# Patient Record
Sex: FEMALE | Race: WHITE | URBAN - METROPOLITAN AREA
[De-identification: names, ages, dates, MRNs, and addresses within clinical notes are randomized per-mention and may not be internally consistent; named-entity substitution may affect disease eponyms.]

---

## 2017-03-27 ENCOUNTER — HOSPITAL ENCOUNTER (OUTPATIENT)
Facility: CLINIC | Age: 52
End: 2017-03-27
Attending: OBSTETRICS & GYNECOLOGY | Admitting: OBSTETRICS & GYNECOLOGY

## 2021-01-18 ENCOUNTER — HOSPITAL ENCOUNTER (OUTPATIENT)
Facility: CLINIC | Age: 56
End: 2021-01-18
Attending: OBSTETRICS & GYNECOLOGY | Admitting: OBSTETRICS & GYNECOLOGY

## 2021-04-02 ENCOUNTER — HOSPITAL ENCOUNTER (OUTPATIENT)
Facility: CLINIC | Age: 56
End: 2021-04-02
Attending: OBSTETRICS & GYNECOLOGY | Admitting: OBSTETRICS & GYNECOLOGY
Payer: COMMERCIAL

## 2021-05-30 DIAGNOSIS — Z11.59 ENCOUNTER FOR SCREENING FOR OTHER VIRAL DISEASES: ICD-10-CM

## 2025-05-07 NOTE — H&P (VIEW-ONLY)
[facility]      Preoperative Consultation   Barbara Ta   : 1965   Gender: female  Date of Encounter: 2025  Date of Surgery: 2025  Surgical Specialty: Oncology/[facility]/Surgical Facility:   Fax number: [phone]  Surgery type: outpatient  Primary Physician: Sandra Rey       History of Present Illness     History of Present Illness  Barbara Ta is a 59 y.o. female (1965) who presents for preop evaluation undergoing SURGERY for treatment of robotic assisted total laparoscopic hysterectomy, bilateral,salpingo oophorectomy,interoperative sentinel lymph node mapping bilateral pelvic sentinel lymph node dissections and pelvic washing     The patient presents for a preoperative evaluation for a hysterectomy.  She is scheduled for a hysterectomy due to the presence of an unidentified mass. Despite attempts at biopsies, the mass remains inaccessible. Initial cervical biopsy results were negative for malignancy. An ultrasound was performed, but no MRI has been conducted. The size of the mass is approximately 5 cm. Lymph nodes have not been evaluated yet. A dye test and biopsies of both lymph nodes are planned. H She has been amenorrheic for over 4 years but recently experienced bleeding.  She had one elevated blood sugar test, but it has since normalized without medication. She was on medication for a few months, and her levels went from 9 to 6, and then down to 5.  She has high blood pressure and is currently taking lisinopril/hydrochlorothiazide and losartan/hydrochlorothiazide. She also takes metoprolol. She had an echocardiogram a long time ago, which showed no issues with the mitral valve and aortic valve.       Review of Systems   A comprehensive review of systems was negative except for items noted in HPI.    Patient Active Problem List   Diagnosis Code     Unspecified essential hypertension I10     Mitral valve  disorders I05.9     Aortic valve disorders I35.9     Primary osteoarthritis of left knee M17.12     Prediabetes R73.03     Controlled type 2 diabetes mellitus without complication, without long-term current use of insulin (HC) E11.9     Current Outpatient Medications   Medication Sig     acetaminophen  mg Extended-Release tablet ONE DAILY     azelastine 137 mcg/actuation (ASTELIN) nasal spray Inhale 1 Spray into affected nostril(s) two times daily.     blood sugar diagnostic (Accu-Chek SmartView Test Strip) strip USE TO TEST ONE TIMES DAILY     blood-glucose meter Dispense meter, test strips, lancets covered by pt ins. E11.9 NIDDM type II - Test 2 times/day. Reason: High A1C     cetirizine (ZYRTEC) 10 mg tablet Take 1 tablet by mouth once daily.     clobetasol (TEMOVATE) 0.05 % cream Apply topically to affected area(s) two times daily. twice daily to rashes at the body until resolved then stop, not for prolonged use in one location     clobetasol 0.05% TOPICAL (TEMOVATE) 0.05 % external solution Apply topically to affected area(s) two times daily.     desonide 0.05 % ointment Twice daily as needed for rash flaring on face, can use at the eyelids but not longer than 7-10 days     fluticasone (50 mcg per actuation) nasal solution (FLONASE) Inhale 1 Spray into affected nostril(s) two times daily.     ibuprofen (ADVIL; MOTRIN) 600 mg tablet TAKE 1 TABLET (600 MG) BY MOUTH THREE TIMES DAILY WITH MEALS. MAXIMUM OF 3200 MG IN 24 HOURS.     lancets (Accu-Chek Fastclix Lancet Drum) USE TO TEST ONE TIMES DAILY     losartan-hydrochlorothiazide (HYZAAR) 100-25 mg tablet Take 1 Tablet by mouth once daily.     metoprolol succinate (Toprol XL) 25 mg Sustained-Release tablet Take 1 Tablet (25 mg) by mouth once daily.     metroNIDAZOLE (METROCREAM) 0.75 % cream apply twice daily to cheeks / nose / chin for rosacea     pimecrolimus (Elidel) 1 % cream Apply topically to affected area(s) two times daily. as needed for milder body  rashes or rashes on face     No current facility-administered medications for this visit.     Allergies   Allergen Reactions     Ciprofloxacin Other - Describe In Comment Field     Eye drops caused  Pain and swelling over medial inferior eye over tear duct- ? Allergic reaction.      Erythromycin Nausea Only and Nausea And Vomiting     Pcn [Penicillins] Rash     Statins-Hmg-Coa Reductase Inhibitors Myalgia     Sulfa (Sulfonamide Antibiotics) Edema and Erythema     Sulfasalazine Edema and Rash     Unknown [Unlisted Allergen (Include Detail In Comments)]      no latex allergy     Past Surgical History:   . Laterality Date     PELVIC LAPAROSCOPY  1990     Social History     Tobacco Use     Smoking status: Never     Smokeless tobacco: Never   Vaping Use     Vaping status: Never Used   Substance Use Topics     Alcohol use: Yes     Comment: on occasion     Drug use: No     Family History   Problem Relation Age of Onset     Hypertension Other         maternal rel     Diabetes Father      Cancer Father         skin, basal cells likely     Heart Disease Paternal Grandfather      Results  Labs   - Cervical biopsy: Negative for malignancy    Imaging   - Ultrasound: Mass of approximately 5 cm      PAST DIFFICULTY WITH ANESTHESIA: None     Physical Exam   /80 (Cuff Site: Right Arm, Position: Sitting, Cuff Size: Adult Regular)   LMP 01/01/2021  There is no height or weight on file to calculate BMI.  Physical Exam  Respiratory: Clear to auscultation, no wheezing, rales or rhonchi  Cardiovascular: Regular rate and rhythm, no murmurs, rubs, or gallops  General Appearance: Pleasant, alert, appropriate appearance for age. No acute distress  Head Exam: Normal. Normocephalic, atraumatic.  Eye Exam: Normal external eye, conjunctiva, lids, cornea. JASSI.  Funduscopic Exam: Normal red reflex and fundoscopic exam.  Ear Exam: Normal TM's bilaterally. Normal auditory canals and external ears. Non-tender.  Nose Exam: Normal external nose,  mucus membranes, and septum.  OroPharynx Exam: Dental hygiene adequate. Normal buccal mucosa. Normal pharynx.  Neck Exam: Supple, no masses or nodes.  Thyroid Exam: No nodules or enlargement.  Chest/Respiratory Exam: Normal chest wall and respirations. Clear to auscultation.  Cardiovascular Exam: Regular rate and rhythm. S1, S2, no murmur, click, gallop, or rubs.  Gastrointestinal Exam: Soft, nontender, no abnormal masses or organomegaly.  Lymphatic Exam: Normal.  Musculoskeletal Exam: Hip: full ROM  Skin: no rash or abnormalities  Neurologic Exam: Nonfocal; symmetric DTRs, normal gross motor movement, tone, and coordination. No tremor.  Psychiatric Exam: Alert and oriented, appropriate affect      Assessment / Plan     Labs: yes  ECG: no  Barbara was seen today for preoperative exam.    Diagnoses and all orders for this visit:    Pre-op exam       Assessment & Plan  1. Preoperative evaluation for hysterectomy.  - Scheduled for hysterectomy due to a mass that has not been biopsied.  - Initial cervical biopsy results were negative for malignancy; ultrasound performed, no MRI conducted; mass size approximately 5 cm.  - Lymph nodes have not been evaluated yet; dye test and biopsies of both lymph nodes are planned.  - Blood test to assess hemoglobin and kidney function; renal function and potassium levels to be evaluated due to current medication regimen of losartan and hydrochlorothiazide; specific instructions regarding medication management preoperatively will be provided.    2. Prediabetes.  - Status will be maintained as is.    3. Hypertension.  - Blood pressure is well-regulated today.  - Currently on lisinopril/hydrochlorothiazide and losartan/hydrochlorothiazide, as well as metoprolol.  - No issues reported with mitral valve and aortic valve; previous echocardiogram results were normal.    Patient is cleared for planned procedure.   Electronically Signed by:   Thank you  Kimi Negrete MD 5/7/2025 4:36 PM    5/7/2025    The Pre-Op Tool    Recommendations      Intermediate Risk Procedure    Risk of CV Complication (RCRI)  0.5%    Current Cardiac Status  Good exertional capacity ( > 4 mets )    Cardiac History  No history of coronary artery disease           Labs  HGB within last 30 days  Potassium within last 30 days  EKG  Not indicated  CXR  Not indicated    Stress Testing  Not indicated    * Testing recommendations are intended to assist, but not direct, clinical decisions.           Type & Screen should be obtained by Anesthesia only if the risk of transfusion is > 5% for the procedure         Hold Losartan / HCTZ the evening before and/or morning of the procedure.  Continue taking Metoprolol (Lopressor, Toprol); be sure to take the evening before and/or morning of the procedure.  Take your other medications as usual prior to the procedure  Hold vitamins and/or supplements for 1 week prior to the procedure  Hold fish oil for 2 weeks prior to the procedure  Okay to take Acetaminophen (Tylenol) up until the procedure  Hold / avoid NSAIDs (e.g. ibuprofen, naproxen) prior to procedure: 2 days for ibuprofen (Advil) and 4 days for naproxen (Aleve).    * Medication recommendations are not intended to be exhaustive; they are limited to common medications that are potentially dangerous if incorrectly managed          Labs  * Data supports elimination of  routine  laboratory testing in favor of focused,  indicated  testing based on medical co-morbidities. A 2009 study randomized 1061 patients undergoing ambulatory, non-cataract surgery to routine or to indicated testing. Perioperative adverse events were similar (Anesthesia & Analgesia 2009;108:467-75; Anesthesiol. Clin. 2016 Mar;34(1):43-58).  EKG  * Age alone is not an absolute indication for a preoperative EKG, and in the absence of clinical risk factors, there is no consensus on the utility of routine preoperative EKG. Our experts recommend against obtaining an EKG if age  < 65 for intermediate risk procedures (Anesthesology. 2012;116(3) 1-17; JACC. 2014;64(21);e1-76).  Stress Testing  * The current ACC/AHA guideline states that 'non-invasive stress testing is not useful for patients [with no clinical risk factors] undergoing noncardiac surgery' (JACC. 2014;64(21);e1-76.).     Session ID: [id number]  Endnotes and bibliography available upon request: [email or web address]

## 2025-05-12 NOTE — PROGRESS NOTES
GYNECOLOGIC ONCOLOGY CONSULT    Patient Name: THAIS OJEDA Patient : 1965  Patient MRN: [id number]  Referring Physician: Carrie Leroy MD  Primary GYN Oncologist: Shivani Schafer (Gynecological/Oncology)  Date of Service: 2025    Reason for Consult:  Postmenopausal bleeding   Uterine mass   Concerning family history for Maxwell syndrome     History of Present Illness (Gyn Oncology):  Ms. Jimenez is a francois 59-year-old  postmenopausal female here today due to a hypervascular uterine mass and history of fibroids. Has also been some interval enlargement in this particular mass. She reports some symptoms including several weeks of discharge and sore nipples, followed by bleeding, which has now stopped. She has not had any symptoms for 4 years prior to this episode. Her 1st grandbaby is due in mid-May and the gender is a surprise. Her son gets  in mid-. She also needs a knee replacement. 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 Pet therapy dog, Rohit, was present during today's visit.     Oncology Treatment Summary  2024: Presented for annual well woman exam with Dr. Leroy. Pap smear performed and returned normal. Fibroids were asymptomatic at that time. Denied any postmenopausal bleeding.   2025: Patient noted postmenopausal bleeding. A pelvic US was done that measured the uterus as 6.7 x 4.4 cm with endometrial thickness of 17.1 mm. There is a distorted and enlarged mass previously described since 2019 as a fibroid that was hypervascular and measured 4.3 x 3 x 4.3 cm. No polyps were identified. There were some continued fibroids noted as well, that had previously been seen going back to prior imaging.   04/10/2025: Patient returned to see Dr. Leroy. The endometrial biopsy ECC and polyp were all benign. Hysterectomy was recommended but GYN oncology consultation was recommended for discussion of surgery given use of frozen section  etc. So, patient would not have to go multiple surgeries.   2025: GYN oncology consultation with Dr. Schafer at [facility]. Discussed future surgery.     Genetic Testing  Genetics referral sent on 2025 visit for KARINA or Armando, concerning family history for Maxwell syndrome  Review of Systems:  A complete 14-point review of systems is negative except as noted in the above history of present illness.    Past Medical History:  Postmenopausal bleeding  Uterine fibroids  Abnormal Pap smear  Aortic valve disorder  Type 2 diabetes mellitus  Dysmenorrhea  Endometriosis  Hypertension  Infertility  Menorrhagia  Obesity  Surgical History:  Breast biopsy  Cervical biopsy  Laparoscopic endometriosis removal  [place] tooth removal  OB/Gyn History:     x 3   Menarche age 10-10 yo.  Age of 1st live birth: 25  Postmenopausal  Contraception: Vasectomy   Currently sexually active.  Allergies#  House dust mite (organism), Mold (organism), Penicillins, Pollen (substance), Sulfa (Sulfonamide Antibiotics), ciprofloxacin and erythromycin base    Medications:  Cetirizine Oral 10 mg capsule 1 capsule orally daily  Atenolol Oral 25 mg tablet 1 tablet orally daily  Losartan-Hydrochlorothiazide Oral 50 mg-12.5 mg  Metoprolol Oral 24 hr Tab (Succinate) 25 mg tablet extended release 24 hr 1 tablet extended release 24 hr orally 2 times per day  Family History:  Colon cancer: maternal grandmother, age 30.    Uterine cancer: maternal great aunts   Prostate cancer: maternal grandfather, age 88   Colon and prostate cancer: maternal uncles, one had prostate cancer and one had both colon and prostate cancer.   Skin cancer: mother     Social History:  No tobacco use.   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  Exercises regularly.  Health Maintenance:  Last colonoscopy: has never had a colonoscopy. Does Cologuard screening.  Last DEXA scan: yes, cannot recall dates.   Last  mammogram: 08/16/2024, BIRADS-2   Last Pap smear: 08/16/2024, NILM cytology, negative HPV co-testing    Vital Signs:  Blood pressure: 128/70, Pulse: 76, Temperature: 98.7 F, Respirations: 16, O2 sat: 97%, Pain Scale: 0, Height: 63.25 in, Weight: 196.4 lb, BSA: 1.92, BMI: 34.52 kg/m2    Physical Exam (Gyn Oncology):  General: alert, cooperative, no acute distress  HEENT: normocephalic, trachea midline, no thyromegaly  Lungs: no labored breathing on room air.  Cardiac: regular rate and rhythm  Abdomen: non-distended  Extremities: no edema  Neurologic Exam: no focal deficits  Psych: normal mood and affect    Laboratory Data:  Surgical pathology on 04/07/2025:  Final diagnosis:  Part A: BENIGN ENDOCERVICAL POLYP.  NO EVIDENCE OF DYSPLASIA OR MALIGNANCY.  Part B: BENIGN ENDOCERVICAL TISSUE.  NO EVIDENCE OF DYSPLASIA OR MALIGNANCY.  Part C: INACTIVE ENDOMETRIUM.  NO EVIDENCE OF HYPERPLASIA OR MALIGNANCY.            Imaging:  Pelvic Ultrasound on 04/07/2025:  Impression:  The uterus is anteverted. The endometrium is ill-defined and visualization of the uterien body is obscured by overlying gas. The endometrial thickness is' 17mm. The uterus appears myomatous but only two discrete fibroids are identified today, both stable and slightly smaller than prior.  In the cervix, there is a hyperechoic, heterogenous, hypervascular mass in the anterior wall. This structure was visualized in 2019 and described as a fibroid. It appears to have increased in size.  1. 08/16/2019: 32.6mm x 24.3mm x 32.5mm  2. 01/18/2021: 33.6mm x 27.0mm x 36mm  3. 04/07/2025: 43.4mm x 30.3mm x 43.4mm (today)  The ovaries are obscured by gas but appear quiet. No discrete adnexal cysts or massses.  Moderate amount of gas present in the pelvis.  No discrete submucosal polyps or fibroids identified during the SIS.  SRG performed three biopsies (not ultrasound guided): ECC, EMB and cervical polyp      Problems:  Endometrium thickened (finding)  Family history  of colon cancer  Postmenopausal bleeding    Assessment & Plan (Gyn Oncology):  59-year-old  postmenopausal female with hypervascular uterine mass, possible fibroid with slight enlargement in size and new onset postmenopausal bleeding, EMB, ECC and polyp were all benign. However, given the additional findings with the interval enlargement of mass and hypervascularity was recommended to undergo GYN oncology consultation and consideration of surgery. Also has concerning family history for Maxwell syndrome.    1. Hypervascular uterine mass + postmenopausal bleeding + concerning family history for Maxwell syndrome   We reviewed the excellent work-up patient has had through [facility].   Did discuss that given her postmenopausal status, that fibroids should be expected to shrink over time. Given that there has been some interval enlargement and some hypervascularity associated with this mass around the cervix, definitive surgery would be the recommendation.   Discussed utility of intraoperative frozen section analysis. Discussed possible cancer staging in the event of identification of malignancy.   Reviewed intraoperative sentinel lymph node mapping and dissections as once uterus is out cannot go back and perform this technique.  This can reduce overall risk of lymphedema if endometrial malignancy is identified.   We will have pathology intraoperatively evaluate the mass via intraoperative frozen section analysis.   Discussed ideally would not disrupt the mass and avoid any leakage of possible malignant cells within the abdominal cavity.   We will want to remove the surgical specimen whole.   Discussed ideally this would occur transvaginally. In the event that this is not possible, would convert to a mini-laparotomy.   Patient will be able to discharge home the same day of surgery even in the event of the mini-laparotomy.  We discussed benefits, risks, and alternatives to surgery.   Benefits include:  "definitive tissue diagnosis, decreased worry/anxiety and not having to do all the interval imaging follow-up visits, ability to inform prognosis, and ability to determine if adjuvant therapies are necessary.   Risks include, but are not limited to: bleeding, infection, injury to surrounding structures, postoperative pain/discomfort, possible future hernia formation, temporary postoperative restrictions, inherent risks of general anesthesia, increased risk of medical complications in postoperative period such as blood clots, heart attack, stroke, and even death.   Alternatives do not exist, that could render a definitive tissue diagnosis. Also would not want to biopsy a mass within the abdominal cavity as could risk seeding a malignancy. Thus, surgery would be recommended gold standard of care based on the conglomeration of findings.   Patient desires to proceed with gold standard of care surgery. She had opportunity to meet with Theresa OGLESBY Gyn Onc RN.  She had the opportunity to meet with Shari SORIA Gyn Onc surgical scheduler.   She had a good understanding of the plan of care.   All questions answered to her satisfaction at today's visit.    2. Medical comorbidities:  Personal history of postmenopausal bleeding, uterine fibroids, abnormal Pap smear, aortic valve disorder, type-2 diabetes mellitus, dysmenorrhea, endometriosis, hypertension, infertility, menorrhagia, obesity.    3. Preoperative considerations  Diagnosis Code: R93.89; N95  Surgery: \"robotic-assisted total laparoscopic hysterectomy, bilateral salpingo-oophorectomy, intraoperative sentinel lymph node mapping, bilateral pelvic sentinel lymph node dissections, pelvic washings\"  Estimated Total Time: 150 minutes  Anesthesia: GENERAL  Alert Pathology for Frozen Section: YES  Bowel Prep: No  Joint Case: No  Patient Status: Same-day discharge  Preoperative Clearance Visit: YES  Labs on Day of Surgery: T&S, Hgb  Surgical Antibiotic Prophylaxis: IV Cefazolin " 2 grams + IV Metronidazole 500 milligrams  VTE Prophylaxis: SQ Heparin 5,000 units + bilateral SCDs  Additional Considerations:  preoperative biopsies were all negative - but persistent lower uterine segment mass - will need frozen section of uterus during the case tentatively schedule for 5/19 as patient requires knee replacement surgery in June    Treatment Plan and Consent  Current treatment plan of surgery for definitive tissue diagnosis was reviewed in detail. See counseling above. Specifically, the counseling included: goals of the treatment, prognosis, frequency, intended benefits, associated risks/harms and side effects and medically reasonable alternatives.    I spent a total of 60 minutes in care of patient at today's visit with 40 minutes spent in direct patient interview, brief examination and surgical counseling. The remainder of time with 20 minutes was spent on review of records, review of pathology report, direct conversation with Dr. Leroy just prior to seeing the patient and care coordination.     I provided the patient an opportunity to ask questions and I answered all of their treatment-related questions to the best of my ability. The patient expressed understanding and consent to this plan of care.    Pain Care Management:  Pain Scale: 0    The patient and family/friends if present were apprised of the use of [employer] remote documentation service and all parties consented to conducting the visit in this manner.    Documentation assistance provided by holly James for Shivani Schafer MD on 05/06/2025.    I, Shivani Schafer MD,  personally performed the services described in this documentation, and it is both accurate and complete.      Shivani Schafer MD  Phone: [phone]  Fax: [phone]

## 2025-05-16 RX ORDER — CETIRIZINE HYDROCHLORIDE 10 MG/1
10 TABLET ORAL DAILY
COMMUNITY

## 2025-05-16 RX ORDER — IBUPROFEN 600 MG/1
600 TABLET, FILM COATED ORAL EVERY 8 HOURS PRN
COMMUNITY

## 2025-05-16 RX ORDER — METOPROLOL SUCCINATE 25 MG/1
25 TABLET, EXTENDED RELEASE ORAL DAILY
COMMUNITY

## 2025-05-16 RX ORDER — LOSARTAN POTASSIUM AND HYDROCHLOROTHIAZIDE 25; 100 MG/1; MG/1
1 TABLET ORAL DAILY
Status: ON HOLD | COMMUNITY
End: 2025-05-19

## 2025-05-16 RX ORDER — CLOBETASOL PROPIONATE 0.5 MG/G
CREAM TOPICAL 2 TIMES DAILY
COMMUNITY

## 2025-05-16 RX ORDER — CLOBETASOL PROPIONATE 0.5 MG/ML
SOLUTION TOPICAL 2 TIMES DAILY
COMMUNITY

## 2025-05-16 RX ORDER — FLUTICASONE PROPIONATE 50 MCG
1 SPRAY, SUSPENSION (ML) NASAL 2 TIMES DAILY
COMMUNITY

## 2025-05-16 RX ORDER — AZELASTINE HYDROCHLORIDE 137 UG/1
SPRAY, METERED NASAL 2 TIMES DAILY
COMMUNITY

## 2025-05-16 RX ORDER — DESONIDE 0.5 MG/G
OINTMENT TOPICAL
COMMUNITY

## 2025-05-16 RX ORDER — SENNOSIDES 8.6 MG
650 CAPSULE ORAL EVERY 8 HOURS PRN
COMMUNITY

## 2025-05-19 ENCOUNTER — ANESTHESIA (OUTPATIENT)
Dept: SURGERY | Facility: CLINIC | Age: 60
End: 2025-05-19
Payer: COMMERCIAL

## 2025-05-19 ENCOUNTER — ANESTHESIA EVENT (OUTPATIENT)
Dept: SURGERY | Facility: CLINIC | Age: 60
End: 2025-05-19
Payer: COMMERCIAL

## 2025-05-19 ENCOUNTER — HOSPITAL ENCOUNTER (OUTPATIENT)
Facility: CLINIC | Age: 60
Discharge: HOME | End: 2025-05-19
Attending: OBSTETRICS & GYNECOLOGY | Admitting: OBSTETRICS & GYNECOLOGY
Payer: COMMERCIAL

## 2025-05-19 VITALS
DIASTOLIC BLOOD PRESSURE: 75 MMHG | OXYGEN SATURATION: 92 % | HEART RATE: 67 BPM | WEIGHT: 193 LBS | HEIGHT: 63 IN | SYSTOLIC BLOOD PRESSURE: 150 MMHG | BODY MASS INDEX: 34.2 KG/M2 | RESPIRATION RATE: 18 BRPM | TEMPERATURE: 97 F

## 2025-05-19 DIAGNOSIS — N95.0 POSTMENOPAUSAL BLEEDING: Primary | ICD-10-CM

## 2025-05-19 PROBLEM — R19.00 PELVIC MASS: Status: ACTIVE | Noted: 2025-05-19

## 2025-05-19 LAB
ABO + RH BLD: NORMAL
BLD GP AB SCN SERPL QL: NEGATIVE
CANCER AG125 SERPL-ACNC: 18 U/ML
HGB BLD-MCNC: 15.8 G/DL (ref 11.7–15.7)
MCV RBC AUTO: 90 FL (ref 78–100)
PATH REPORT.COMMENTS IMP SPEC: NORMAL
PATH REPORT.INTRAOP OBS SPEC DOC: NORMAL
SPECIMEN EXP DATE BLD: NORMAL

## 2025-05-19 PROCEDURE — 250N000011 HC RX IP 250 OP 636: Performed by: PHYSICIAN ASSISTANT

## 2025-05-19 PROCEDURE — 250N000009 HC RX 250: Performed by: OBSTETRICS & GYNECOLOGY

## 2025-05-19 PROCEDURE — 258N000003 HC RX IP 258 OP 636: Performed by: ANESTHESIOLOGY

## 2025-05-19 PROCEDURE — 710N000010 HC RECOVERY PHASE 1, LEVEL 2, PER MIN: Performed by: OBSTETRICS & GYNECOLOGY

## 2025-05-19 PROCEDURE — 85018 HEMOGLOBIN: CPT | Performed by: PHYSICIAN ASSISTANT

## 2025-05-19 PROCEDURE — 88331 PATH CONSLTJ SURG 1 BLK 1SPC: CPT | Mod: 26 | Performed by: PATHOLOGY

## 2025-05-19 PROCEDURE — 250N000009 HC RX 250: Performed by: NURSE ANESTHETIST, CERTIFIED REGISTERED

## 2025-05-19 PROCEDURE — 86901 BLOOD TYPING SEROLOGIC RH(D): CPT | Performed by: PHYSICIAN ASSISTANT

## 2025-05-19 PROCEDURE — 88331 PATH CONSLTJ SURG 1 BLK 1SPC: CPT | Mod: TC | Performed by: OBSTETRICS & GYNECOLOGY

## 2025-05-19 PROCEDURE — 258N000003 HC RX IP 258 OP 636: Performed by: NURSE ANESTHETIST, CERTIFIED REGISTERED

## 2025-05-19 PROCEDURE — 258N000001 HC RX 258: Performed by: OBSTETRICS & GYNECOLOGY

## 2025-05-19 PROCEDURE — 710N000012 HC RECOVERY PHASE 2, PER MINUTE: Performed by: OBSTETRICS & GYNECOLOGY

## 2025-05-19 PROCEDURE — 36415 COLL VENOUS BLD VENIPUNCTURE: CPT | Performed by: OBSTETRICS & GYNECOLOGY

## 2025-05-19 PROCEDURE — 250N000025 HC SEVOFLURANE, PER MIN: Performed by: OBSTETRICS & GYNECOLOGY

## 2025-05-19 PROCEDURE — 86304 IMMUNOASSAY TUMOR CA 125: CPT | Performed by: OBSTETRICS & GYNECOLOGY

## 2025-05-19 PROCEDURE — 250N000011 HC RX IP 250 OP 636: Mod: JZ | Performed by: PHYSICIAN ASSISTANT

## 2025-05-19 PROCEDURE — 272N000001 HC OR GENERAL SUPPLY STERILE: Performed by: OBSTETRICS & GYNECOLOGY

## 2025-05-19 PROCEDURE — 88305 TISSUE EXAM BY PATHOLOGIST: CPT | Mod: TC | Performed by: OBSTETRICS & GYNECOLOGY

## 2025-05-19 PROCEDURE — 360N000080 HC SURGERY LEVEL 7, PER MIN: Performed by: OBSTETRICS & GYNECOLOGY

## 2025-05-19 PROCEDURE — 250N000013 HC RX MED GY IP 250 OP 250 PS 637: Performed by: PHYSICIAN ASSISTANT

## 2025-05-19 PROCEDURE — 88307 TISSUE EXAM BY PATHOLOGIST: CPT | Mod: 26 | Performed by: PATHOLOGY

## 2025-05-19 PROCEDURE — 88305 TISSUE EXAM BY PATHOLOGIST: CPT | Mod: 26 | Performed by: PATHOLOGY

## 2025-05-19 PROCEDURE — 250N000011 HC RX IP 250 OP 636: Performed by: NURSE ANESTHETIST, CERTIFIED REGISTERED

## 2025-05-19 PROCEDURE — 999N000141 HC STATISTIC PRE-PROCEDURE NURSING ASSESSMENT: Performed by: OBSTETRICS & GYNECOLOGY

## 2025-05-19 PROCEDURE — 370N000017 HC ANESTHESIA TECHNICAL FEE, PER MIN: Performed by: OBSTETRICS & GYNECOLOGY

## 2025-05-19 PROCEDURE — 250N000011 HC RX IP 250 OP 636: Mod: JZ | Performed by: OBSTETRICS & GYNECOLOGY

## 2025-05-19 PROCEDURE — 250N000009 HC RX 250: Performed by: ANESTHESIOLOGY

## 2025-05-19 PROCEDURE — 250N000011 HC RX IP 250 OP 636: Mod: JZ | Performed by: ANESTHESIOLOGY

## 2025-05-19 RX ORDER — CEFAZOLIN SODIUM/WATER 2 G/20 ML
2 SYRINGE (ML) INTRAVENOUS SEE ADMIN INSTRUCTIONS
Status: DISCONTINUED | OUTPATIENT
Start: 2025-05-19 | End: 2025-05-19 | Stop reason: HOSPADM

## 2025-05-19 RX ORDER — LIDOCAINE HYDROCHLORIDE 20 MG/ML
INJECTION, SOLUTION INFILTRATION; PERINEURAL PRN
Status: DISCONTINUED | OUTPATIENT
Start: 2025-05-19 | End: 2025-05-19

## 2025-05-19 RX ORDER — BUPIVACAINE HYDROCHLORIDE 5 MG/ML
INJECTION, SOLUTION EPIDURAL; INTRACAUDAL; PERINEURAL PRN
Status: DISCONTINUED | OUTPATIENT
Start: 2025-05-19 | End: 2025-05-19 | Stop reason: HOSPADM

## 2025-05-19 RX ORDER — SCOPOLAMINE 1 MG/3D
1 PATCH, EXTENDED RELEASE TRANSDERMAL ONCE
Status: DISCONTINUED | OUTPATIENT
Start: 2025-05-19 | End: 2025-05-19 | Stop reason: HOSPADM

## 2025-05-19 RX ORDER — HEPARIN SODIUM 5000 [USP'U]/.5ML
5000 INJECTION, SOLUTION INTRAVENOUS; SUBCUTANEOUS
Status: COMPLETED | OUTPATIENT
Start: 2025-05-19 | End: 2025-05-19

## 2025-05-19 RX ORDER — ONDANSETRON 4 MG/1
4 TABLET, ORALLY DISINTEGRATING ORAL EVERY 30 MIN PRN
Status: DISCONTINUED | OUTPATIENT
Start: 2025-05-19 | End: 2025-05-19 | Stop reason: HOSPADM

## 2025-05-19 RX ORDER — ONDANSETRON 2 MG/ML
4 INJECTION INTRAMUSCULAR; INTRAVENOUS EVERY 30 MIN PRN
Status: DISCONTINUED | OUTPATIENT
Start: 2025-05-19 | End: 2025-05-19 | Stop reason: HOSPADM

## 2025-05-19 RX ORDER — ACETAMINOPHEN 325 MG/1
975 TABLET ORAL ONCE
Status: COMPLETED | OUTPATIENT
Start: 2025-05-19 | End: 2025-05-19

## 2025-05-19 RX ORDER — DEXAMETHASONE SODIUM PHOSPHATE 4 MG/ML
4 INJECTION, SOLUTION INTRA-ARTICULAR; INTRALESIONAL; INTRAMUSCULAR; INTRAVENOUS; SOFT TISSUE
Status: DISCONTINUED | OUTPATIENT
Start: 2025-05-19 | End: 2025-05-19 | Stop reason: HOSPADM

## 2025-05-19 RX ORDER — IBUPROFEN 400 MG/1
800 TABLET, FILM COATED ORAL ONCE
Status: DISCONTINUED | OUTPATIENT
Start: 2025-05-19 | End: 2025-05-19 | Stop reason: HOSPADM

## 2025-05-19 RX ORDER — KETOROLAC TROMETHAMINE 15 MG/ML
15 INJECTION, SOLUTION INTRAMUSCULAR; INTRAVENOUS ONCE
Status: COMPLETED | OUTPATIENT
Start: 2025-05-19 | End: 2025-05-19

## 2025-05-19 RX ORDER — LOSARTAN POTASSIUM AND HYDROCHLOROTHIAZIDE 25; 100 MG/1; MG/1
1 TABLET ORAL DAILY
COMMUNITY

## 2025-05-19 RX ORDER — SODIUM CHLORIDE, SODIUM LACTATE, POTASSIUM CHLORIDE, CALCIUM CHLORIDE 600; 310; 30; 20 MG/100ML; MG/100ML; MG/100ML; MG/100ML
INJECTION, SOLUTION INTRAVENOUS CONTINUOUS
Status: DISCONTINUED | OUTPATIENT
Start: 2025-05-19 | End: 2025-05-19 | Stop reason: HOSPADM

## 2025-05-19 RX ORDER — FENTANYL CITRATE 0.05 MG/ML
50 INJECTION, SOLUTION INTRAMUSCULAR; INTRAVENOUS EVERY 5 MIN PRN
Refills: 0 | Status: DISCONTINUED | OUTPATIENT
Start: 2025-05-19 | End: 2025-05-19 | Stop reason: HOSPADM

## 2025-05-19 RX ORDER — GLYCOPYRROLATE 0.2 MG/ML
INJECTION, SOLUTION INTRAMUSCULAR; INTRAVENOUS PRN
Status: DISCONTINUED | OUTPATIENT
Start: 2025-05-19 | End: 2025-05-19

## 2025-05-19 RX ORDER — CEFAZOLIN SODIUM/WATER 2 G/20 ML
2 SYRINGE (ML) INTRAVENOUS
Status: COMPLETED | OUTPATIENT
Start: 2025-05-19 | End: 2025-05-19

## 2025-05-19 RX ORDER — NALOXONE HYDROCHLORIDE 0.4 MG/ML
0.1 INJECTION, SOLUTION INTRAMUSCULAR; INTRAVENOUS; SUBCUTANEOUS
Status: DISCONTINUED | OUTPATIENT
Start: 2025-05-19 | End: 2025-05-19 | Stop reason: HOSPADM

## 2025-05-19 RX ORDER — PROPOFOL 10 MG/ML
INJECTION, EMULSION INTRAVENOUS PRN
Status: DISCONTINUED | OUTPATIENT
Start: 2025-05-19 | End: 2025-05-19

## 2025-05-19 RX ORDER — HYDROMORPHONE HCL IN WATER/PF 6 MG/30 ML
0.2 PATIENT CONTROLLED ANALGESIA SYRINGE INTRAVENOUS EVERY 5 MIN PRN
Refills: 0 | Status: DISCONTINUED | OUTPATIENT
Start: 2025-05-19 | End: 2025-05-19 | Stop reason: HOSPADM

## 2025-05-19 RX ORDER — ONDANSETRON 2 MG/ML
INJECTION INTRAMUSCULAR; INTRAVENOUS PRN
Status: DISCONTINUED | OUTPATIENT
Start: 2025-05-19 | End: 2025-05-19

## 2025-05-19 RX ORDER — FENTANYL CITRATE 0.05 MG/ML
25 INJECTION, SOLUTION INTRAMUSCULAR; INTRAVENOUS EVERY 5 MIN PRN
Refills: 0 | Status: DISCONTINUED | OUTPATIENT
Start: 2025-05-19 | End: 2025-05-19 | Stop reason: HOSPADM

## 2025-05-19 RX ORDER — METRONIDAZOLE 500 MG/100ML
500 INJECTION, SOLUTION INTRAVENOUS
Status: COMPLETED | OUTPATIENT
Start: 2025-05-19 | End: 2025-05-19

## 2025-05-19 RX ORDER — OXYCODONE HYDROCHLORIDE 5 MG/1
5 TABLET ORAL
Status: COMPLETED | OUTPATIENT
Start: 2025-05-19 | End: 2025-05-19

## 2025-05-19 RX ORDER — AMOXICILLIN 250 MG
1-2 CAPSULE ORAL 2 TIMES DAILY PRN
Qty: 30 TABLET | Refills: 0 | Status: SHIPPED | OUTPATIENT
Start: 2025-05-19

## 2025-05-19 RX ORDER — FENTANYL CITRATE 50 UG/ML
INJECTION, SOLUTION INTRAMUSCULAR; INTRAVENOUS PRN
Status: DISCONTINUED | OUTPATIENT
Start: 2025-05-19 | End: 2025-05-19

## 2025-05-19 RX ORDER — OXYCODONE HYDROCHLORIDE 5 MG/1
5-10 TABLET ORAL EVERY 4 HOURS PRN
Qty: 10 TABLET | Refills: 0 | Status: SHIPPED | OUTPATIENT
Start: 2025-05-19

## 2025-05-19 RX ORDER — HYDROMORPHONE HCL IN WATER/PF 6 MG/30 ML
0.4 PATIENT CONTROLLED ANALGESIA SYRINGE INTRAVENOUS EVERY 5 MIN PRN
Refills: 0 | Status: DISCONTINUED | OUTPATIENT
Start: 2025-05-19 | End: 2025-05-19 | Stop reason: HOSPADM

## 2025-05-19 RX ORDER — INDOCYANINE GREEN AND WATER 25 MG
KIT INJECTION PRN
Status: DISCONTINUED | OUTPATIENT
Start: 2025-05-19 | End: 2025-05-19 | Stop reason: HOSPADM

## 2025-05-19 RX ORDER — DEXAMETHASONE SODIUM PHOSPHATE 4 MG/ML
INJECTION, SOLUTION INTRA-ARTICULAR; INTRALESIONAL; INTRAMUSCULAR; INTRAVENOUS; SOFT TISSUE PRN
Status: DISCONTINUED | OUTPATIENT
Start: 2025-05-19 | End: 2025-05-19

## 2025-05-19 RX ORDER — ACETAMINOPHEN 325 MG/1
975 TABLET ORAL ONCE
Status: DISCONTINUED | OUTPATIENT
Start: 2025-05-19 | End: 2025-05-19 | Stop reason: HOSPADM

## 2025-05-19 RX ADMIN — PHENYLEPHRINE HYDROCHLORIDE 100 MCG: 10 INJECTION INTRAVENOUS at 12:58

## 2025-05-19 RX ADMIN — ROCURONIUM BROMIDE 30 MG: 50 INJECTION, SOLUTION INTRAVENOUS at 11:59

## 2025-05-19 RX ADMIN — PROPOFOL 100 MG: 10 INJECTION, EMULSION INTRAVENOUS at 12:02

## 2025-05-19 RX ADMIN — PROPOFOL 200 MG: 10 INJECTION, EMULSION INTRAVENOUS at 11:25

## 2025-05-19 RX ADMIN — GLYCOPYRROLATE 0.2 MG: 0.2 INJECTION, SOLUTION INTRAMUSCULAR; INTRAVENOUS at 11:56

## 2025-05-19 RX ADMIN — ONDANSETRON 4 MG: 2 INJECTION INTRAMUSCULAR; INTRAVENOUS at 13:32

## 2025-05-19 RX ADMIN — DEXAMETHASONE SODIUM PHOSPHATE 4 MG: 4 INJECTION, SOLUTION INTRA-ARTICULAR; INTRALESIONAL; INTRAMUSCULAR; INTRAVENOUS; SOFT TISSUE at 11:39

## 2025-05-19 RX ADMIN — ROCURONIUM BROMIDE 20 MG: 50 INJECTION, SOLUTION INTRAVENOUS at 12:43

## 2025-05-19 RX ADMIN — KETOROLAC TROMETHAMINE 15 MG: 15 INJECTION, SOLUTION INTRAMUSCULAR; INTRAVENOUS at 14:59

## 2025-05-19 RX ADMIN — OXYCODONE 5 MG: 5 TABLET ORAL at 14:49

## 2025-05-19 RX ADMIN — ACETAMINOPHEN 975 MG: 325 TABLET, FILM COATED ORAL at 11:03

## 2025-05-19 RX ADMIN — FENTANYL CITRATE 50 MCG: 50 INJECTION INTRAMUSCULAR; INTRAVENOUS at 11:25

## 2025-05-19 RX ADMIN — SCOPOLAMINE 1 PATCH: 1.5 PATCH, EXTENDED RELEASE TRANSDERMAL at 11:08

## 2025-05-19 RX ADMIN — DEXMEDETOMIDINE HYDROCHLORIDE 8 MCG: 100 INJECTION, SOLUTION INTRAVENOUS at 12:11

## 2025-05-19 RX ADMIN — PHENYLEPHRINE HYDROCHLORIDE 0.5 MCG/KG/MIN: 10 INJECTION INTRAVENOUS at 11:37

## 2025-05-19 RX ADMIN — SODIUM CHLORIDE, SODIUM LACTATE, POTASSIUM CHLORIDE, AND CALCIUM CHLORIDE: .6; .31; .03; .02 INJECTION, SOLUTION INTRAVENOUS at 11:04

## 2025-05-19 RX ADMIN — Medication 2 G: at 11:25

## 2025-05-19 RX ADMIN — FENTANYL CITRATE 50 MCG: 50 INJECTION INTRAMUSCULAR; INTRAVENOUS at 11:57

## 2025-05-19 RX ADMIN — METRONIDAZOLE 500 MG: 500 INJECTION, SOLUTION INTRAVENOUS at 10:56

## 2025-05-19 RX ADMIN — HYDROMORPHONE HYDROCHLORIDE 0.5 MG: 1 INJECTION, SOLUTION INTRAMUSCULAR; INTRAVENOUS; SUBCUTANEOUS at 13:42

## 2025-05-19 RX ADMIN — LIDOCAINE HYDROCHLORIDE 100 MG: 20 INJECTION, SOLUTION INFILTRATION; PERINEURAL at 11:25

## 2025-05-19 RX ADMIN — Medication 200 MG: at 13:37

## 2025-05-19 RX ADMIN — HEPARIN SODIUM 5000 UNITS: 5000 INJECTION, SOLUTION INTRAVENOUS; SUBCUTANEOUS at 11:01

## 2025-05-19 RX ADMIN — PROPOFOL 20 MCG/KG/MIN: 10 INJECTION, EMULSION INTRAVENOUS at 11:38

## 2025-05-19 RX ADMIN — FENTANYL CITRATE 50 MCG: 0.05 INJECTION, SOLUTION INTRAMUSCULAR; INTRAVENOUS at 14:13

## 2025-05-19 RX ADMIN — MIDAZOLAM 2 MG: 1 INJECTION INTRAMUSCULAR; INTRAVENOUS at 11:16

## 2025-05-19 RX ADMIN — DEXMEDETOMIDINE HYDROCHLORIDE 12 MCG: 100 INJECTION, SOLUTION INTRAVENOUS at 12:00

## 2025-05-19 RX ADMIN — SODIUM CHLORIDE, SODIUM LACTATE, POTASSIUM CHLORIDE, AND CALCIUM CHLORIDE: .6; .31; .03; .02 INJECTION, SOLUTION INTRAVENOUS at 13:35

## 2025-05-19 RX ADMIN — ROCURONIUM BROMIDE 50 MG: 50 INJECTION, SOLUTION INTRAVENOUS at 11:25

## 2025-05-19 ASSESSMENT — ENCOUNTER SYMPTOMS
DYSRHYTHMIAS: 0
SEIZURES: 0

## 2025-05-19 ASSESSMENT — LIFESTYLE VARIABLES: TOBACCO_USE: 0

## 2025-05-19 ASSESSMENT — ACTIVITIES OF DAILY LIVING (ADL)
ADLS_ACUITY_SCORE: 41

## 2025-05-19 NOTE — OR NURSING
Meets criteria for discharge.  Discharge instructions reviewed with pt and pt's designated responsible party.  Pt label on prescription bag from pharmacy matched to pt's wristband. Pharmacy bag opened with 2 prescriptions inside. Medications were reviewed to match pt wristband while pt and significant other agreed with identification. Prescriptions placed back in pharmacy bag resealed with tape and  per pt request.

## 2025-05-19 NOTE — OR NURSING
noted rash (not hives, poss. Petechial hemorrhage) on both sides of face and forehead that moves to lower neck bilat that was not present in pre-op. Dr. Agarwal notified and had noted that in pacu.advised the most likely cause is med reaction and that it should go away in a few days but if still present to follow up with primary MD. Pt and  notified

## 2025-05-19 NOTE — OP NOTE
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     ESTIMATED BLOOD LOSS: 30 cc     IV FLUIDS: 1200 ml crystalloid    URINE OUTPUT: 100 cc clear urine     INDICATIONS: Barbara Ta is a 59 year old  postmenopausal female here today due to a hypervascular uterine mass and history of fibroids. Has also been some interval enlargement in this particular mass.      Oncology Treatment Summary  2024: Presented for annual well woman exam with Dr. Leroy. Pap smear performed and returned normal. Fibroids were asymptomatic at that time. Denied any postmenopausal bleeding.   2025: Patient noted postmenopausal bleeding. A pelvic US was done that measured the uterus as 6.7 x 4.4 cm with endometrial thickness of 17.1 mm. There is a distorted and enlarged mass previously described since 2019 as a fibroid that was hypervascular and measured 4.3 x 3 x 4.3 cm. No polyps were identified. There were some continued fibroids noted as well, that had previously been seen going back to prior imaging.   04/10/2025: Patient returned to see Dr. Leroy. The endometrial biopsy ECC and polyp were all benign. Hysterectomy was recommended but GYN oncology consultation was recommended for discussion of surgery given use of frozen section etc. So, patient would not have to go multiple surgeries.   2025: GYN oncology consultation with Dr. Schafer at [facility]. Discussed future surgery.   2025: [facility] counseling  consultation. Patient desired to proceed with germline genetic testing. Results are pending.       FINDINGS: On pelvic examination under anesthesia, bilateral vulva were without masses or lesions. The vaginal mucosa was well-estrogenized, without any masses or lesions. The cervix was grossly normal. The uterus sounded to 7 cm.    On laparoscopy, there was no free fluid. The uterus was normal-sized, however, there was a ~4 x 4 cm bulbous mass that appeared between the upper vagina and normal-appearing cervix. It had the gross appearance of a fibroid. Bilateral adnexa were without any gross abnormalities. The bladder and posterior cul-de-sac were smooth. Bilateral ureters were noted to be vermiculating throughout the case and without evidence of hydronephrosis. [place] lymph nodes mapped, bilaterally. The right pelvic sentinel lymph node was identified along the mid-common right external iliac artery. The left pelvic sentinel lymph node #1 was noted in the left obturator space and the left pelvic sentinel lymph node #2 was noted along the mid-common left external iliac artery.    Intraoperative frozen section analysis benign etiology, favor leiomyoma. Thus, no additional cancer staging procedures were indicated.    There was no bulky retroperitoneal adenopathy. The peritoneal surfaces were smooth, including bilateral hemidiaphragms. The liver, stomach and omentum were without any obvious abnormalities. The small bowel, appendix and colon were normal-appearing on brief survey. Surgicell x 2 was applied to all surgical dissection sites. There was excellent hemostasis at the conclusion of the procedure.    SPECIMENS:   ID Type Source Tests Collected by Time Destination   1 : Pelvic washings Washings Pelvis NON-GYNECOLOGIC CYTOLOGY Shivani Schafer MD 5/19/2025 12:08 PM    2 : RIGHT SENTINEL PELVIC LYMPH NODE Tissue Lymph Node(s), Pelvis, Right SURGICAL PATHOLOGY EXAM Shivani Schafer MD 5/19/2025 12:20 PM    3 :  LEFT SENTINEL PELVIC LYMPH NODE #1 Tissue Lymph Node(s), Pelvis, Left SURGICAL PATHOLOGY EXAM Shivani Schafer MD 5/19/2025 12:23 PM    4 : LEFT SENTINEL PELVIC LYMPH NODE #2 Tissue Lymph Node(s), Pelvis, Left SURGICAL PATHOLOGY EXAM Shivani Schafer MD 5/19/2025 12:27 PM    5 : UTERUS, CERVIX, BILATERAL TUBES & OVARIES Tissue Uterus, Cervix, Bilateral Fallopian Tubes & Ovaries SURGICAL PATHOLOGY EXAM Shivani Schafer MD 5/19/2025 12:47 PM      COMPLICATIONS: None.    CONDITION: Stable to PACU.    OPERATIVE PROCEDURE IN DETAIL: Consent was reviewed with the patient in the preoperative setting and confirmed. She received prophylactic antibiotics with IV Cefazolin 2 grams and IV Metronidazole 500 milligrams. In addition, she received prophylactic SQ Heparin 5,000 units and bilateral sequential compression devices for venous thromboembolism prevention. A surgical debriefing was performed with the operating room team prior to patient entry into the operating room. The patient was transferred to the operating room and placed in dorsal supine position. General anesthetic was obtained in the usual manner without noted difficulties. The patient was then positioned onto yellowfin stirrups. The patient was prepped and draped for the above-mentioned procedure.    Timeout was called at which point the patient's name, procedure and operative site was confirmed by the operative team. The umbilicus was elevated and the Veress needle introduced through the base of the umbilicus. The abdomen was insufflated with an opening pressure of 3 mmHg. The Veress needle was removed. Sites for the da Elsi XI laparoscopic ports were demarcated, total of 5, initiating' midline approximately 4 cm above the umbilicus and positioned in a straight line around the upper abdomen. The initial midline 8 mm port was inserted without any issues. A TAP block was placed using local analgesia in 4 locations along the abdominal wall. The remaining 4  ports were placed under direct visualization. Left lateral ports were placed with 8 mm da Elsi XI robotic ports x 2. Additional 8 mm da Elsi XI robotic port was placed along medial right side x 1. The right lateral most port was 8 mm AirSeal port. CO2 insufflation was switched over to AirSeal mode. At this point, the patient was placed into steep Trendelenburg. The pelvis was inspected as well as the upper abdomen as noted above. Bowels were packed up into the upper abdomen with gentle traction.     The Sinclair catheter was placed under sterile technique. A speculum was placed in the vagina and instruments for the uterine manipulator were positioned. The anterior lip of the cervix was grasped. The uterus sounded to 7 cm, and cervix was serially dilated, under direct visualization using robotic camera. Indocyanine green (ICG) dye was injected superficially and deep at 3 o'clock and 9 o'clock. Approximately 2 1/2 cc was used x 4. The medium VCare uterine manipulator was inserted and the cervical cup affixed without any difficulty. Attention was turned back to the upper abdomen. Next, the da Elsi XI robot was docked onto the ports, and all appropriate instruments were inserted. Pelvic washings were collected and sent to surgical cytology for routine analysis.     The procedure was initiated by performing bilateral sentinel lymph node mapping. The da Elsi XI firefly mode was utilized to denote the ICG uptake along lymphatic channels. A sentinel node was mapped bilaterally with further details above. We isolated the right round ligament, which was cauterized and transected using synchroseal device. The posterior leaf of the broad ligament was dissected. The right ureter was identified and noted to be vermiculating. A peritoneal window was made below the right infundibulopelvic ligament, well above the right ureter. The right IP ligament was multiply sealed and transected using the synchroseal device. The posterior  "peritoneum was dissected to the level of the posterior uterus with the right ureter in view at all times. The right ureter was retracted medially from the sentinel lymph node. The right external iliac artery and right external iliac vein were both identified. The right obturator nerve was identified. The node was carefully excised from these critical structures and removed via assistant port. This was labeled as \"right pelvic sentinel lymph node\" and sent to surgical pathology for routine analysis. The same process was repeated on the left side with respective labeling of \"left pelvic sentinel lymph node #1\" and \"left pelvic sentinel lymph node #2\" and also sent for routine analysis. Some filmy adhesions of the sigmoid colon were taken down along the line of [place] on the left side.     At this point, we initiated the hysterectomy by incising the vesicouterine peritoneum. The bladder flap was developed well at the upper vagina. The mass was bulbous, but,  nicely from the bladder. The mass was still above the green cup of the uterine manipulator and was incorporated en bloc into the entire specimen to avoid any intraoperative spillage or rupture. The uterus had adequate mobility, bilateral uterine arteries could be isolated and these were cauterized with the synchroseal device and transected. Initially on the right, we extended along the cardinal and uterosacral ligaments, staying close to the cervix to the level of the upper vagina. This was cauterized and transected using the synchroseal device. Similarly, we isolated out the left cardinal ligament and uterosacral ligament which were cauterized and transected. At this point, we were able to palpate the line of the medium VCare cup at the level of the upper vagina in a circumferential manner. We incised along the upper vagina to resect the cervix and uterus. A 15 mm endocatch bag was inserted transvaginally and the specimen was placed inside the bag and " "removed intact without any intraoperative spillage or rupture. The specimen was then brought out through the vaginal opening, contained within the bag, and labeled \"uterus, cervix, bilateral fallopian tubes and ovaries\" and sent to surgical pathology for intraoperative frozen section analysis.    A wet laparotomy sponge was inserted to obtain adequate insufflation. The vaginal cuff had been well-developed and clearly the bladder was out of the way. The vaginal cuff was closed with running V-lock suture with excellent re-approximation.     Intraoperative frozen section analysis benign etiology, favor leiomyoma. Thus, no additional cancer staging procedures were indicated.    The pelvis was inspected and copiously irrigated. Surgicell x 2 was placed along the pelvic dissection beds. There was excellent hemostasis at the conclusion of the procedure.    All laparoscopic instruments and ports were now removed and CO2 allowed to escape from the abdomen. The da Elsi XI robot was undocked without incident. Skin was reapproximated with 4-0 Monocryl sutures and sterile skin glue applied.    Sandra Trotter PA-C, was present and assisted the entire procedure. She assisted with abdominal entry, port placement, docking of robotic arms, adequate visualization, retraction, uterine manipulation, bedside assisting via assistant port, surgical specimen handling/retrieval, undocking of robotic arms, and skin closure.      The laparotomy sponge was removed from the vagina. The Sinclair catheter was removed from the bladder.    All sponge, lap, needle and instrument counts were correct x 2. The patient tolerated the procedure well and there were no complications. She was returned to the supine position and general anesthesia was reversed without difficulty. An abdominal binder was placed on the patient. She was taken to the recovery room in stable condition. I was present and scrubbed the entire procedure.    Shivani Schafer, " MD  Gynecologic Oncology  [facility]   [facility]  05/19/2025

## 2025-05-19 NOTE — ANESTHESIA CARE TRANSFER NOTE
Patient: Barbara Ta    Procedure: Procedure(s):  ROBOTIC-ASSISTED TOTAL LAPAROSCOPIC HYSTERECTOMY, BILATERAL SALPINGO-OOPHORECTOMY, INTRAOPERATIVE SENTINEL LYMPH NODE MAPPING, BILATERAL PELVIC SENTINEL LYMPH NODE DISSECTIONS, PELVIC WASHINGS       Diagnosis: Abnormal findings on diagnostic imaging of other specified body structures [R93.89]  Postmenopausal bleeding [N95.0]  Diagnosis Additional Information: No value filed.    Anesthesia Type:   General     Note:    Oropharynx: oropharynx clear of all foreign objects and spontaneously breathing  Level of Consciousness: drowsy  Oxygen Supplementation: face mask  Level of Supplemental Oxygen (L/min / FiO2): 4  Independent Airway: airway patency satisfactory and stable  Dentition: dentition unchanged  Vital Signs Stable: post-procedure vital signs reviewed and stable  Report to RN Given: handoff report given  Patient transferred to: PACU    Handoff Report: Identifed the Patient, Identified the Reponsible Provider, Reviewed the pertinent medical history, Discussed the surgical course, Reviewed Intra-OP anesthesia mangement and issues during anesthesia, Set expectations for post-procedure period and Allowed opportunity for questions and acknowledgement of understanding      Vitals:  Vitals Value Taken Time   /105 05/19/25 14:03   Temp 97.0 05/19/25  1403   Pulse 72 05/19/25 14:04   Resp 11 05/19/25 14:04   SpO2 97 % 05/19/25 14:04   Vitals shown include unfiled device data.    Electronically Signed By: Riley Jacob APRN CRNA  May 19, 2025  2:05 PM

## 2025-05-19 NOTE — ANESTHESIA POSTPROCEDURE EVALUATION
Patient: Barbara Ta    Procedure: Procedure(s):  ROBOTIC-ASSISTED TOTAL LAPAROSCOPIC HYSTERECTOMY, BILATERAL SALPINGO-OOPHORECTOMY, INTRAOPERATIVE SENTINEL LYMPH NODE MAPPING, BILATERAL PELVIC SENTINEL LYMPH NODE DISSECTIONS, PELVIC WASHINGS       Anesthesia Type:  General    Note:  Disposition: Outpatient   Postop Pain Control: Uneventful            Sign Out: Well controlled pain   PONV: No   Neuro/Psych: Uneventful            Sign Out: Acceptable/Baseline neuro status   Airway/Respiratory: Uneventful            Sign Out: Acceptable/Baseline resp. status   CV/Hemodynamics: Uneventful            Sign Out: Acceptable CV status; No obvious hypovolemia; No obvious fluid overload   Other NRE: NONE   DID A NON-ROUTINE EVENT OCCUR? No           Last vitals:  Vitals Value Taken Time   /89 05/19/25 15:00   Temp 36.2  C (97.1  F) 05/19/25 14:45   Pulse 56 05/19/25 15:14   Resp 21 05/19/25 15:14   SpO2 96 % 05/19/25 15:14   Vitals shown include unfiled device data.    Electronically Signed By: Nicole Agarwal MD  May 19, 2025  4:24 PM

## 2025-05-19 NOTE — DISCHARGE INSTRUCTIONS
Today you were given 975 mg of Tylenol at 11:00 am. The recommended daily maximum dose is 4000 mg.     Today you received Toradol, an antiinflammatory medication similar to Ibuprofen.  You should not take other antiinflammatory medication, such as Ibuprofen, Motrin, Advil, Aleve, Naprosyn, etc until 9:00pm.      Same Day Surgery Discharge Instructions for  Sedation and General Anesthesia     It's not unusual to feel dizzy, light-headed or faint for up to 24 hours after surgery or while taking pain medication.  If you have these symptoms: sit for a few minutes before standing and have someone assist you when you get up to walk or use the bathroom.    You should rest and relax for the next 24 hours. We recommend you make arrangements to have an adult stay with you for at least 24 hours after your discharge.  Avoid hazardous and strenuous activity.    DO NOT DRIVE any vehicle or operate mechanical equipment for 24 hours following the end of your surgery.  Even though you may feel normal, your reactions may be affected by the medication you have received.    Do not drink alcoholic beverages for 24 hours following surgery.     Slowly progress to your regular diet as you feel able. It's not unusual to feel nauseated and/or vomit after receiving anesthesia.  If you develop these symptoms, drink clear liquids (apple juice, ginger ale, broth, 7-up, etc. ) until you feel better.  If your nausea and vomiting persists for 24 hours, please notify your surgeon.      All narcotic pain medications, along with inactivity and anesthesia, can cause constipation. Drinking plenty of liquids and increasing fiber intake will help.    For any questions of a medical nature, call your surgeon.    Do not make important decisions for 24 hours.    If you had general anesthesia, you may have a sore throat for a couple of days related to the breathing tube used during surgery.  You may use Cepacol lozenges to help with this discomfort.  If it  worsens or if you develop a fever, contact your surgeon.     If you feel your pain is not well managed with the pain medications prescribed by your surgeon, please contact your surgeon's office to let them know so they can address your concerns.     **If you have questions or concerns about your procedure,   call Dr. Schafer at [phone]**

## 2025-05-19 NOTE — ANESTHESIA PROCEDURE NOTES
Airway         Procedure Start/Stop Times: 5/19/2025 11:28 AM  Staff -        Anesthesiologist:  Nicole Agarwal MD       CRNA: Alie Bhatt APRN CRNA       Performed By: CRNAIndications and Patient Condition       Indications for airway management: negrito-procedural       Induction type:intravenous       Mask difficulty assessment: 2 - vent by mask + OA or adjuvant +/- NMBA    Final Airway Details       Final airway type: endotracheal airway       Successful airway: ETT - single  Endotracheal Airway Details        ETT size (mm): 7.0       Cuffed: yes       Successful intubation technique: direct laryngoscopy       DL Blade Type: MAC 3       Grade View of Cords: 1       Adjucts: stylet       Position: Right       Measured from: lips       Secured at (cm): 22       Bite block used: None    Post intubation assessment        Placement verified by: capnometry, equal breath sounds and chest rise        Number of attempts at approach: 1       Number of other approaches attempted: 0       Secured with: tape       Ease of procedure: easy       Dentition: Intact and Unchanged    Medication(s) Administered   Medication Administration Time: 5/19/2025 11:28 AM

## 2025-05-19 NOTE — ANESTHESIA PREPROCEDURE EVALUATION
"Anesthesia Pre-Procedure Evaluation    Patient: Barbara Ta   MRN: [id number] : 1965          Procedure : Procedure(s):  ROBOTIC ASSISTED TOTAL LAPAROSCOPIC HYSTERECTOMY, BILATERAL SALPINGO OOPHORECTOMY, INTRAOPERATIVE SENTINEL LYMPH NODE MAPPING, BILATERAL PELVIC SENTINEL LYMPH NODE DISSECTIONS, PELVIC WASHINGS         Past Medical History:   Diagnosis Date    Arthritis     Diabetes (H)     Heart murmur     Hypertension       Past Surgical History:   Procedure Laterality Date    GI SURGERY      pelvic laparoscopy      Allergies   Allergen Reactions    Ciprofloxacin     Erythromycin Nausea and Vomiting    Statins Muscle Pain (Myalgia)    Sulfa Antibiotics      Edema and erythema    Penicillins Rash    Sulfasalazine Rash     Edema      Social History     Tobacco Use    Smoking status: Never    Smokeless tobacco: Never   Substance Use Topics    Alcohol use: Yes     Comment: occ      Wt Readings from Last 1 Encounters:   No data found for Wt        Anesthesia Evaluation   Pt has had prior anesthetic.     History of anesthetic complications  - motion sickness.      ROS/MED HX  ENT/Pulmonary:    (-) tobacco use, asthma and recent URI   Neurologic:    (-) no seizures (? 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    Other Findings       OUTSIDE LABS:  CBC: No results found for: \"WBC\", \"HGB\", \"HCT\", \"PLT\"  BMP: No results found for: \"NA\", \"POTASSIUM\", \"CHLORIDE\", \"CO2\", \"BUN\", \"CR\", \"GLC\"  COAGS: No results found for: \"PTT\", \"INR\", \"FIBR\"  POC: No results found for: \"BGM\", \"HCG\", \"HCGS\"  HEPATIC: No results found for: \"ALBUMIN\", \"PROTTOTAL\", \"ALT\", \"AST\", \"GGT\", \"ALKPHOS\", \"BILITOTAL\", \"BILIDIRECT\", \"ALESSANDRO\"  OTHER: No results found for: \"PH\", \"LACT\", \"A1C\", \"KATIE\", \"PHOS\", \"MAG\", \"LIPASE\", \"AMYLASE\", \"TSH\", \"T4\", \"T3\", \"CRP\", \"SED\"    Anesthesia Plan    ASA Status:  2      NPO Status: NPO Appropriate   Anesthesia Type: General.  Airway: oral.  Induction: intravenous.  Maintenance: Balanced.        Consents    Anesthesia Plan(s) and associated risks, benefits, and realistic alternatives discussed. Questions answered and patient/representative(s) expressed understanding.     - Discussed:     - Discussed with:  Patient               Postoperative Care    Pain management: non-narcotic analgesics, plan for postoperative opioid use, multimodal analgesia.     Comments:                   Nicole Agarwal MD    I have reviewed the pertinent notes and labs in the chart from the past 30 days and (re)examined the patient.  Any updates or changes from those notes are reflected in this note.    Clinically Significant Risk Factors Present on Admission                   # Hypertension: Home medication list includes antihypertensive(s)                            "

## 2025-05-21 LAB
PATH REPORT.COMMENTS IMP SPEC: NORMAL
PATH REPORT.FINAL DX SPEC: NORMAL
PATH REPORT.GROSS SPEC: NORMAL
PATH REPORT.MICROSCOPIC SPEC OTHER STN: NORMAL
PATH REPORT.RELEVANT HX SPEC: NORMAL

## 2025-05-23 LAB
PATH REPORT.COMMENTS IMP SPEC: NORMAL
PATH REPORT.COMMENTS IMP SPEC: NORMAL
PATH REPORT.FINAL DX SPEC: NORMAL
PATH REPORT.GROSS SPEC: NORMAL
PATH REPORT.INTRAOP OBS SPEC DOC: NORMAL
PATH REPORT.MICROSCOPIC SPEC OTHER STN: NORMAL
PATH REPORT.RELEVANT HX SPEC: NORMAL
PHOTO IMAGE: NORMAL

## 2025-06-01 ENCOUNTER — HEALTH MAINTENANCE LETTER (OUTPATIENT)
Age: 60
End: 2025-06-01

## (undated) DEVICE — DAVINCI XI OBTURATOR BLADELESS 8MM 470359

## (undated) DEVICE — DAVINCI XI SEAL UNIVERSAL 5-12MM 470500

## (undated) DEVICE — LINEN TOWEL PACK X5 5464

## (undated) DEVICE — ANTIFOG SOLUTION SEE SHARP 150M TROCAR SWABS 30978 (COI)

## (undated) DEVICE — SURGICEL POWDER ABSORBABLE HEMOSTAT 3GM 3013SP

## (undated) DEVICE — ENDO TROCAR CONMED AIRSEAL BLADELESS 08X120MM IAS8-120LP

## (undated) DEVICE — DRSG GAUZE 2X2" 8042

## (undated) DEVICE — SYR 10ML FINGER CONTROL W/O NDL 309695

## (undated) DEVICE — DRAPE IOBAN INCISE 23X17" 6650EZ

## (undated) DEVICE — CLEANER INST PRE-KLENZ SOAK SHIELD TUBE 6 ML MEDIUM 2D66J4

## (undated) DEVICE — SUCTION IRR STRYKERFLOW II W/TIP 250-070-520

## (undated) DEVICE — PACK DAVINCI GYN SMA15GDFS1

## (undated) DEVICE — SU MONOCRYL 4-0 PS-2 18" UND Y496G

## (undated) DEVICE — DRSG STERI STRIP 1/2X4" R1547

## (undated) DEVICE — DRAPE CV SPLIT II 147X106" 9158

## (undated) DEVICE — BNDG ABDOMINAL BINDER 9X62-84" 79-89210

## (undated) DEVICE — DAVINCI XI DRAPE COLUMN 470341

## (undated) DEVICE — SOL NACL 0.9% INJ 1000ML BAG 2B1324X

## (undated) DEVICE — SU WND CLOSURE VLOC 180 ABS 0 12" GS-21 VLOCL0316

## (undated) DEVICE — GLOVE PROTEXIS W/NEU-THERA 6.5  2D73TE65

## (undated) DEVICE — DAVINCI HOT SHEARS TIP COVER  400180

## (undated) DEVICE — ESU GROUND PAD UNIVERSAL W/O CORD

## (undated) DEVICE — SPONGE LAP 18X18" X8435

## (undated) DEVICE — DAVINCI XI TISSUE SEALER SYNCROSEAL 8MM 480440

## (undated) DEVICE — SOL NACL 0.9% IRRIG 1000ML BOTTLE 2F7124

## (undated) DEVICE — NDL INSUFFLATION 13GA 120MM C2201

## (undated) DEVICE — NDL SPINAL 22GA 3.5" QUINCKE 405181

## (undated) DEVICE — CATH TRAY FOLEY SURESTEP 16FR WDRAIN BAG STLK LATEX A300316A

## (undated) DEVICE — TUBING CONMED AIRSEAL SMOKE EVAC INSUFFLATION ASM-EVAC

## (undated) DEVICE — POUCH TISSUE RETRIEVAL 15MM 6.00" INTRO TRS190SB2

## (undated) DEVICE — DRSG TEGADERM 2 1/2X 2 3/4"

## (undated) DEVICE — KIT TURNOVER FAIRVIEW SOUTHDALE HALF SP3890

## (undated) DEVICE — SPONGE RAY-TEC 4X4" 7317

## (undated) DEVICE — RETR ELEV / UTERINE MANIPULATOR V-CARE MED CUP 60-6085-201

## (undated) DEVICE — DAVINCI XI DRAPE ARM 470015

## (undated) DEVICE — APPLICATOR ENDOSCOPIC 5 SURGICEL POWDER 3123SPEA

## (undated) DEVICE — GLOVE PROTEXIS BLUE W/NEU-THERA 6.5  2D73EB65

## (undated) RX ORDER — HEPARIN SODIUM 5000 [USP'U]/.5ML
INJECTION, SOLUTION INTRAVENOUS; SUBCUTANEOUS
Status: DISPENSED
Start: 2025-05-19

## (undated) RX ORDER — CEFAZOLIN SODIUM 1 G/3ML
INJECTION, POWDER, FOR SOLUTION INTRAMUSCULAR; INTRAVENOUS
Status: DISPENSED
Start: 2025-05-19

## (undated) RX ORDER — KETOROLAC TROMETHAMINE 15 MG/ML
INJECTION, SOLUTION INTRAMUSCULAR; INTRAVENOUS
Status: DISPENSED
Start: 2025-05-19

## (undated) RX ORDER — WATER 10 ML/10ML
INJECTION INTRAMUSCULAR; INTRAVENOUS; SUBCUTANEOUS
Status: DISPENSED
Start: 2025-05-19

## (undated) RX ORDER — FENTANYL CITRATE 0.05 MG/ML
INJECTION, SOLUTION INTRAMUSCULAR; INTRAVENOUS
Status: DISPENSED
Start: 2025-05-19

## (undated) RX ORDER — DEXAMETHASONE SODIUM PHOSPHATE 4 MG/ML
INJECTION, SOLUTION INTRA-ARTICULAR; INTRALESIONAL; INTRAMUSCULAR; INTRAVENOUS; SOFT TISSUE
Status: DISPENSED
Start: 2025-05-19

## (undated) RX ORDER — BUPIVACAINE HYDROCHLORIDE 5 MG/ML
INJECTION, SOLUTION EPIDURAL; INTRACAUDAL; PERINEURAL
Status: DISPENSED
Start: 2025-05-19

## (undated) RX ORDER — ACETAMINOPHEN 325 MG/1
TABLET ORAL
Status: DISPENSED
Start: 2025-05-19

## (undated) RX ORDER — PROPOFOL 10 MG/ML
INJECTION, EMULSION INTRAVENOUS
Status: DISPENSED
Start: 2025-05-19

## (undated) RX ORDER — HYDROMORPHONE HYDROCHLORIDE 1 MG/ML
INJECTION, SOLUTION INTRAMUSCULAR; INTRAVENOUS; SUBCUTANEOUS
Status: DISPENSED
Start: 2025-05-19

## (undated) RX ORDER — METRONIDAZOLE 500 MG/100ML
INJECTION, SOLUTION INTRAVENOUS
Status: DISPENSED
Start: 2025-05-19

## (undated) RX ORDER — OXYCODONE HYDROCHLORIDE 5 MG/1
TABLET ORAL
Status: DISPENSED
Start: 2025-05-19

## (undated) RX ORDER — FENTANYL CITRATE 50 UG/ML
INJECTION, SOLUTION INTRAMUSCULAR; INTRAVENOUS
Status: DISPENSED
Start: 2025-05-19

## (undated) RX ORDER — SCOPOLAMINE 1 MG/3D
PATCH, EXTENDED RELEASE TRANSDERMAL
Status: DISPENSED
Start: 2025-05-19

## (undated) RX ORDER — ONDANSETRON 2 MG/ML
INJECTION INTRAMUSCULAR; INTRAVENOUS
Status: DISPENSED
Start: 2025-05-19

## (undated) RX ORDER — INDOCYANINE GREEN AND WATER 25 MG
KIT INJECTION
Status: DISPENSED
Start: 2025-05-19